# Patient Record
Sex: MALE | Race: WHITE | NOT HISPANIC OR LATINO | URBAN - METROPOLITAN AREA
[De-identification: names, ages, dates, MRNs, and addresses within clinical notes are randomized per-mention and may not be internally consistent; named-entity substitution may affect disease eponyms.]

---

## 2017-03-26 ENCOUNTER — OFFICE VISIT (OUTPATIENT)
Dept: URGENT CARE | Facility: PHYSICIAN GROUP | Age: 32
End: 2017-03-26
Payer: COMMERCIAL

## 2017-03-26 VITALS
WEIGHT: 315 LBS | HEART RATE: 98 BPM | RESPIRATION RATE: 16 BRPM | OXYGEN SATURATION: 97 % | BODY MASS INDEX: 46.64 KG/M2 | DIASTOLIC BLOOD PRESSURE: 82 MMHG | TEMPERATURE: 98.2 F | SYSTOLIC BLOOD PRESSURE: 140 MMHG

## 2017-03-26 DIAGNOSIS — J06.9 ACUTE URI: ICD-10-CM

## 2017-03-26 DIAGNOSIS — R05.9 COUGH: ICD-10-CM

## 2017-03-26 DIAGNOSIS — J98.8 CONGESTION OF UPPER AIRWAY: ICD-10-CM

## 2017-03-26 PROCEDURE — 99214 OFFICE O/P EST MOD 30 MIN: CPT | Performed by: PHYSICIAN ASSISTANT

## 2017-03-26 RX ORDER — AMOXICILLIN AND CLAVULANATE POTASSIUM 875; 125 MG/1; MG/1
1 TABLET, FILM COATED ORAL 2 TIMES DAILY
Qty: 20 TAB | Refills: 0 | Status: SHIPPED | OUTPATIENT
Start: 2017-03-26 | End: 2017-11-26

## 2017-03-26 NOTE — MR AVS SNAPSHOT
Jimmy Melton   3/26/2017 10:15 AM   Office Visit   MRN: [id number]    Department:  [facility]   Dept Phone:  [phone]    Description:  Male : 1985   Provider:  Aime Minor PA-C           Reason for Visit     Headache sinus pain, congestion, sore throat, cough x4 days      Allergies as of 3/26/2017     No Known Allergies      You were diagnosed with     Cough   [786.2.ICD-9-CM]       Acute URI   [879355]       Congestion of upper airway   [882354]         Vital Signs     Blood Pressure Pulse Temperature Respirations Weight Oxygen Saturation    140/82 mmHg 98 36.8 °C (98.2 °F) 16 156.037 kg (344 lb) 97%    Smoking Status                   Never Smoker            Basic Information     Date Of Birth Sex Race Ethnicity Preferred Language    1985 Male White Non- English      Health Maintenance        Date Due Completion Dates    IMM DTaP/Tdap/Td Vaccine (1 - Tdap) 2002 ---    IMM INFLUENZA (1) 2016 ---            Current Immunizations     No immunizations on file.      Below and/or attached are the medications your provider expects you to take. Review all of your home medications and newly ordered medications with your provider and/or pharmacist. Follow medication instructions as directed by your provider and/or pharmacist. Please keep your medication list with you and share with your provider. Update the information when medications are discontinued, doses are changed, or new medications (including over-the-counter products) are added; and carry medication information at all times in the event of emergency situations     Allergies:  No Known Allergies          Medications  Valid as of: 2017 - 10:51 AM    Generic Name Brand Name Tablet Size Instructions for use    Amoxicillin-Pot Clavulanate (Tab) AUGMENTIN 875-125 MG Take 1 Tab by mouth 2 times a day.        Azithromycin (Tab) ZITHROMAX 250 MG Take 2 tablets PO on day one, then 1 tablet PO on day two to  five.        Erythromycin (Ointment) erythromycin 5 MG/GM Use as directed.        Fluticasone Propionate (Suspension) FLONASE 50 MCG/ACT Spray 2 Sprays in nose every day.        Hydrocod Polst-Chlorphen Polst (Liquid CR) TUSSIONEX 10-8 MG/5ML Take 5 mL by mouth every 12 hours as needed for Cough or Rhinitis.        Tobramycin (Solution) TOBREX 0.3 % Place 1 Drop in right eye every 4 hours.        .                 Medicines prescribed today were sent to:     [id number] - [place], [place] - [address] [address]    Phone: [phone] Fax: [phone]    Open 24 Hours?: No      Medication refill instructions:       If your prescription bottle indicates you have medication refills left, it is not necessary to call your provider’s office. Please contact your pharmacy and they will refill your medication.    If your prescription bottle indicates you do not have any refills left, you may request refills at any time through one of the following ways: The online [employer] system (except Urgent Care), by calling your provider’s office, or by asking your pharmacy to contact your provider’s office with a refill request. Medication refills are processed only during regular business hours and may not be available until the next business day. Your provider may request additional information or to have a follow-up visit with you prior to refilling your medication.   *Please Note: Medication refills are assigned a new Rx number when refilled electronically. Your pharmacy may indicate that no refills were authorized even though a new prescription for the same medication is available at the pharmacy. Please request the medicine by name with the pharmacy before contacting your provider for a refill.           [facility] [employer] Access Code: [email or web address]  Expires: 4/25/2017 10:50 AM    Your email address is not on file at [facility].  Email Addresses are required for you to sign up for  [employer], please contact [phone] to verify your personal information and to provide your email address prior to attempting to register for [employer].    Jimmy Melton  [address] [place] [address]  [place] [address]    [employer]  A secure, online tool to manage your health information     [employer]’s [employer]® is a secure, online tool that connects you to your personalized health information from the privacy of your home -- day or night - making it very easy for you to manage your healthcare. Once the activation process is completed, you can even access your medical information using the [employer] yaz, which is available for free in the Apple Yaz store or Google Play store.     To learn more about [employer], visit [email or web address]    There are two levels of access available (as shown below):   My Chart Features  [facility] Primary Care Doctor [facility]  Specialists [facility]  Urgent  Care Non-[facility] Primary Care Doctor   Email your healthcare team securely and privately 24/7 X X X    Manage appointments: schedule your next appointment; view details of past/upcoming appointments X      Request prescription refills. X      View recent personal medical records, including lab and immunizations X X X X   View health record, including health history, allergies, medications X X X X   Read reports about your outpatient visits, procedures, consult and ER notes X X X X   See your discharge summary, which is a recap of your hospital and/or ER visit that includes your diagnosis, lab results, and care plan X X  X     How to register for [employer]:  Once your e-mail address has been verified, follow the following steps to sign up for [employer].     1. Go to  [email or web address]  2. Click on the Sign Up Now box, which takes you to the New Member Sign Up page. You will need to provide the following information:  a. Enter your [employer] Access Code exactly as it appears at the top of this page. (You will not need to use this code after you’ve completed  the sign-up process. If you do not sign up before the expiration date, you must request a new code.)   b. Enter your date of birth.   c. Enter your home email address.   d. Click Submit, and follow the next screen’s instructions.  3. Create a [employer] ID. This will be your [employer] login ID and cannot be changed, so think of one that is secure and easy to remember.  4. Create a [employer] password. You can change your password at any time.  5. Enter your Password Reset Question and Answer. This can be used at a later time if you forget your password.   6. Enter your e-mail address. This allows you to receive e-mail notifications when new information is available in [employer].  7. Click Sign Up. You can now view your health information.    For assistance activating your [employer] account, call [phone]

## 2017-03-26 NOTE — PROGRESS NOTES
Urgent Care Note URI  This patients PCP is: Kin Sorensen M.D.    Reason for visit:cough and congestion    HPI:  Jimmy Melton is a 31 y.o. old patient who is seeing us today in the [facility] system.  This is a pleasant patient and I appreciate the opportunity to participate in the care of this patient.  This is a new problem today    1. Cough  Cough with congestion HA and facial pressure congestion and sore throat and not feeling well for 5 days    2. Acute URI  fatigue    3. Congestion of upper airway  Not feeling well.  His family is also sick and being seen today       ROS:   Constitutional: +fatigue,   HEENT: No Headache, +sore throat.  Ears:  +ear pain  Eyes:  No blurred vision, No goopy/crusted eyes  Cardiac: No racing heart rate  Resp: No shortness of breath,  +cough, No wheezing.  Gastro: No nausea or vomiting, No diarrhea.    All other systems were reviewed and were negative.      Past Medical History:  There are no active problems to display for this patient.      Past Surgical History:  History reviewed. No pertinent past surgical history.    Allergies:  Review of patient's allergies indicates no known allergies.    Social History:  Social History     Social History   • Marital Status:      Spouse Name: N/A   • Number of Children: N/A   • Years of Education: N/A     Occupational History   • Not on file.     Social History Main Topics   • Smoking status: Never Smoker    • Smokeless tobacco: Never Used   • Alcohol Use: No   • Drug Use: Not on file   • Sexual Activity: Not on file     Other Topics Concern   • Not on file     Social History Narrative       Family History:  History reviewed. No pertinent family history.    Medications:    Current outpatient prescriptions:   •  amoxicillin-clavulanate (AUGMENTIN) 875-125 MG Tab, Take 1 Tab by mouth 2 times a day., Disp: 20 Tab, Rfl: 0  •  tobramycin (TOBREX) 0.3 % Solution ophthalmic solution, Place 1 Drop in right eye every 4 hours.,  Disp: 1 Bottle, Rfl: 0  •  erythromycin 5 MG/GM Ointment, Use as directed., Disp: 1 Tube, Rfl: 0  •  azithromycin (ZITHROMAX) 250 MG TABS, Take 2 tablets PO on day one, then 1 tablet PO on day two to five., Disp: 6 Tab, Rfl: 0  •  hydrocod polst-chlorphen polst (TUSSIONEX) 10-8 MG/5ML LQCR, Take 5 mL by mouth every 12 hours as needed for Cough or Rhinitis., Disp: 140 mL, Rfl: 0  •  fluticasone (FLONASE) 50 MCG/ACT nasal spray, Spray 2 Sprays in nose every day., Disp: 1 Bottle, Rfl: 0    Physical Examination:   Vital signs: /82 mmHg  Pulse 98  Temp(Src) 36.8 °C (98.2 °F)  Resp 16  Wt 156.037 kg (344 lb)  SpO2 97%  General: No distress, cooperative, well dressed and well nourished.   Eyes: No scleral icterus or discharge, No hyposphagma  ENMT: Normal on external inspection of nose, lips, No nasal drainage   Throat:  No exudate   +erythema  Ears: slight pink bilat  Neck: No abnormal masses on inspection  Resp: Normal effort, Bilateral clear to auscultation, No wheezing,   CVS: Regular rate and rhythm,  No murmur.   Extremities: No edema bilateral extremities  Neuro: Alert and oriented  Skin: No rash, No Ulcers  Psych: Normal mood and affect      Assessment and Plan:    1. Cough  OTC meds ok to use    2. Acute URI  Augmentin 875 given      3. Congestion of upper airway  During this patients visit with me today an antibiotic may have been given.  Complete the full course of the antibiotic as directed.  Bacterial infections will usually begin to get better about 36-48hours after you begin the antibiotic.  Viral infection will typically start to get better after 4-6days.  Drink plenty of water to stay hydrated.  Take over-the-counter Ibuprofen or Tylenol if needed for pain, discomfort or fever as directed by the product label.  Increase your rest.  Use a humidifier if needed.  Use an over-the-counter anti-histimine if needed.  Use an over-the-counter cough medication such as Robutussin DM or Delsym ER for cough  if indicated and pay attention to the warnings on the label.  Use the prescription cough medication as directed if you were prescribed one.  Follow-up with your PCP in 3-7 days if you are not getting better.  Return to  or the ER if symptoms become worse.  Patient understands these discharge instructions.      Aime Minor PA-C  03/26/2017    CC:   Kin Sorensen M.D.

## 2017-11-26 ENCOUNTER — OFFICE VISIT (OUTPATIENT)
Dept: URGENT CARE | Facility: PHYSICIAN GROUP | Age: 32
End: 2017-11-26
Payer: COMMERCIAL

## 2017-11-26 VITALS
DIASTOLIC BLOOD PRESSURE: 82 MMHG | HEIGHT: 72 IN | OXYGEN SATURATION: 94 % | TEMPERATURE: 97.9 F | RESPIRATION RATE: 14 BRPM | SYSTOLIC BLOOD PRESSURE: 138 MMHG | HEART RATE: 88 BPM | WEIGHT: 315 LBS | BODY MASS INDEX: 42.66 KG/M2

## 2017-11-26 DIAGNOSIS — J01.40 ACUTE NON-RECURRENT PANSINUSITIS: Primary | ICD-10-CM

## 2017-11-26 LAB
INT CON NEG: NEGATIVE
INT CON POS: POSITIVE
S PYO AG THROAT QL: NEGATIVE

## 2017-11-26 PROCEDURE — 87880 STREP A ASSAY W/OPTIC: CPT | Performed by: NURSE PRACTITIONER

## 2017-11-26 PROCEDURE — 99214 OFFICE O/P EST MOD 30 MIN: CPT | Performed by: NURSE PRACTITIONER

## 2017-11-26 RX ORDER — AMOXICILLIN AND CLAVULANATE POTASSIUM 875; 125 MG/1; MG/1
1 TABLET, FILM COATED ORAL 2 TIMES DAILY
Qty: 20 TAB | Refills: 0 | Status: SHIPPED | OUTPATIENT
Start: 2017-11-26

## 2017-11-26 ASSESSMENT — ENCOUNTER SYMPTOMS
WEAKNESS: 1
VOMITING: 0
FEVER: 0
COUGH: 1
SORE THROAT: 1
DIARRHEA: 0
HEADACHES: 1
SINUS PRESSURE: 1
SPUTUM PRODUCTION: 1
SHORTNESS OF BREATH: 0
MYALGIAS: 0
NAUSEA: 0
SINUS PAIN: 1
CHILLS: 0

## 2017-11-26 NOTE — PATIENT INSTRUCTIONS

## 2017-11-26 NOTE — PROGRESS NOTES
Subjective:      Jimmy Melton is a 32 y.o. male who presents with Cough (x 3 days / congestion / sore throat / head cold)            Medications, Allergies and Prior Medical Hx reviewed and updated in [facility].with patient/family today         Sinusitis   This is a new problem. The current episode started 1 to 4 weeks ago (2-3 wks). The problem has been gradually worsening since onset. There has been no fever. The pain is moderate. Associated symptoms include congestion, coughing, headaches, sinus pressure and a sore throat. Pertinent negatives include no chills, ear pain or shortness of breath. Past treatments include oral decongestants. The treatment provided mild relief.       Review of Systems   Constitutional: Positive for malaise/fatigue. Negative for chills and fever.   HENT: Positive for congestion, sinus pain, sinus pressure and sore throat. Negative for ear discharge and ear pain.    Respiratory: Positive for cough and sputum production. Negative for shortness of breath.    Gastrointestinal: Negative for diarrhea, nausea and vomiting.   Musculoskeletal: Negative for myalgias.   Neurological: Positive for weakness and headaches.          Objective:     /82   Pulse 88   Temp 36.6 °C (97.9 °F)   Resp 14   Ht 1.829 m (6')   Wt (!) 150.6 kg (332 lb)   SpO2 94%   BMI 45.03 kg/m²      Physical Exam   Constitutional: He appears well-developed and well-nourished. No distress.   HENT:   Head: Normocephalic and atraumatic.   Right Ear: Tympanic membrane and ear canal normal.   Left Ear: Tympanic membrane and ear canal normal.   Nose: Rhinorrhea present. Right sinus exhibits maxillary sinus tenderness and frontal sinus tenderness. Left sinus exhibits maxillary sinus tenderness and frontal sinus tenderness.   Mouth/Throat: Uvula is midline and mucous membranes are normal. No trismus in the jaw. No uvula swelling. Posterior oropharyngeal edema and posterior oropharyngeal erythema present. No oropharyngeal  exudate.   Eyes: Conjunctivae are normal. Pupils are equal, round, and reactive to light.   Neck: Neck supple.   Cardiovascular: Normal rate, regular rhythm and normal heart sounds.    Pulmonary/Chest: Effort normal and breath sounds normal. No respiratory distress. He has no wheezes.   Lymphadenopathy:     He has cervical adenopathy.   Neurological: He is alert.   Skin: Skin is warm and dry.   Psychiatric: He has a normal mood and affect. His behavior is normal.   Vitals reviewed.              Assessment/Plan:       1. Acute non-recurrent pansinusitis  amoxicillin-clavulanate (AUGMENTIN) 875-125 MG Tab    POCT Rapid Strep A         Modified Epic generated written imformation provided along with verbal instructions    Rest, Fluids, tylenol, ibuprofen, sinus irrigation,  humidified air, and otc decongestants  Pt will go to the ER for worsening or changing symptoms as discussed,   Follow-up with your primary care provider or return here if not improving in 5 - 7 days   Discharge instructions discussed with pt/family who verbalize understanding and agreement with poc